# Patient Record
Sex: MALE | Race: WHITE | NOT HISPANIC OR LATINO | Employment: OTHER | URBAN - METROPOLITAN AREA
[De-identification: names, ages, dates, MRNs, and addresses within clinical notes are randomized per-mention and may not be internally consistent; named-entity substitution may affect disease eponyms.]

---

## 2017-10-25 NOTE — PATIENT DISCUSSION
MEIBOMIAN GLAND DYSFUNCTION, OU:  PRESCRIBE WARM COMPRESSES AND EYELID SCRUBS QD-BID, ARTIFICIAL TEARS BID-QID, THE DAILY INTAKE OF OMEGA-3 FATTY ACIDS  AND_____________________. WILL CONSIDER LIPIFLOW TREATMENT NEXT VISIT IF NOT RESPONSIVE TO TREATMENT OR IF SYMPTOMS PERSIST. RETURN FOR FOLLOW-UP AS SCHEDULED.

## 2017-10-25 NOTE — PATIENT DISCUSSION
demodex counseling:  If an older patient presents with blepharitis, especially an older patient with rosacea, Demodex mite infestation could be the cause. The incidence of Demodex infestation increases with age, occurring in 80 percent of the population at age 61 and in 80 percent of the population older than 79years of age. 1

## 2017-11-29 NOTE — PATIENT DISCUSSION
still minimal clinical findings. pt still has some mild demodex however insurance will not cover appropriate treatment. continue lid scrubs to minimize irritation from debris and add systane balance qid.

## 2017-12-29 NOTE — PATIENT DISCUSSION
New Prescription: Artificial Tears (PF) (dextran 70-hypromellose (pf)): dropperette: 0.1-0.3% 1 drop four times a day into both eyes 12-

## 2018-11-07 NOTE — PATIENT DISCUSSION
MEIBOMIAN GLAND DYSFUNCTION OU:  I have explained that dry eye syndrome may cause many ocular symptoms including irritation, burning, tearing, and blurry vision. Frequent high quality artificial tears will help relieve these symptoms. Recommend patient use over the counter artificial tears at least four times daily. The patient was given a list of quality artificial tears to choose from (Genteal, Systane, Refresh, TheraTears, Blink) and was advised not to use Visine or Clear Eyes. Warm compresses with digital massage three times daily. Advised patient that if symptoms of discomfort did not improve or if they worse, then the patient should return to clinic. Will continue to monitor.

## 2018-11-07 NOTE — PATIENT DISCUSSION
POSTERIOR CAPSULAR OPACITY, OU: Educated patient about diagnosis and that symptoms of worsening growth may be similar to a cataract but it is not a cataract. Informed patient that in the future, a laser procedure called a YAG Capsulotomy may be needed to clear up vision. YAG Capsulotomy not indicated at this time. Will continue to monitor and evaluate yearly.

## 2018-11-07 NOTE — PATIENT DISCUSSION
PTOSIS BUL: Patient is not bothered by condition. Educated patient that should the they become symptomatic to the eyelids bothering vision, a referral to Dr. Shakira Duke can be arranged for a lid evaluation and possible lid surgery. Will continue to monitor.

## 2018-11-07 NOTE — PATIENT DISCUSSION
BLEPHARITIS:  I have explained the nature of chronic blepharitis and have instructed the patient in lid hygiene techniques. The patient is instructed to gently wash the lid margins daily with warm water and baby shampoo on a washrag followed by warm water irrigation.

## 2021-01-18 ENCOUNTER — NEW PATIENT COMPREHENSIVE (OUTPATIENT)
Dept: URBAN - METROPOLITAN AREA CLINIC 47 | Facility: CLINIC | Age: 71
End: 2021-01-18

## 2021-01-18 DIAGNOSIS — H52.7: ICD-10-CM

## 2021-01-18 DIAGNOSIS — Z96.1: ICD-10-CM

## 2021-01-18 DIAGNOSIS — H35.371: ICD-10-CM

## 2021-01-18 PROCEDURE — 92015 DETERMINE REFRACTIVE STATE: CPT

## 2021-01-18 PROCEDURE — 92004 COMPRE OPH EXAM NEW PT 1/>: CPT

## 2021-01-18 ASSESSMENT — VISUAL ACUITY
OS_SC: 20/30-1
OS_SC: J2-
OD_SC: J12
OD_SC: 20/60-2
OD_CC: J8+
OS_CC: J2+

## 2021-01-18 ASSESSMENT — TONOMETRY
OS_IOP_MMHG: 16
OD_IOP_MMHG: 16

## 2021-01-20 ENCOUNTER — RETINA CONSULT (OUTPATIENT)
Dept: URBAN - METROPOLITAN AREA CLINIC 43 | Facility: CLINIC | Age: 71
End: 2021-01-20

## 2021-01-20 DIAGNOSIS — H35.371: ICD-10-CM

## 2021-01-20 DIAGNOSIS — H35.033: ICD-10-CM

## 2021-01-20 DIAGNOSIS — H35.30: ICD-10-CM

## 2021-01-20 PROCEDURE — 92250 FUNDUS PHOTOGRAPHY W/I&R: CPT

## 2021-01-20 PROCEDURE — 99214 OFFICE O/P EST MOD 30 MIN: CPT

## 2021-01-20 ASSESSMENT — TONOMETRY
OD_IOP_MMHG: 13
OS_IOP_MMHG: 13

## 2021-01-20 ASSESSMENT — VISUAL ACUITY
OD_SC: 20/60
OS_SC: 20/30+1

## 2021-10-25 ENCOUNTER — ESTABLISHED COMPREHENSIVE EXAM (OUTPATIENT)
Dept: URBAN - METROPOLITAN AREA CLINIC 47 | Facility: CLINIC | Age: 71
End: 2021-10-25

## 2021-10-25 DIAGNOSIS — H35.371: ICD-10-CM

## 2021-10-25 DIAGNOSIS — H35.033: ICD-10-CM

## 2021-10-25 DIAGNOSIS — H16.223: ICD-10-CM

## 2021-10-25 DIAGNOSIS — H43.813: ICD-10-CM

## 2021-10-25 DIAGNOSIS — H52.7: ICD-10-CM

## 2021-10-25 DIAGNOSIS — H52.03: ICD-10-CM

## 2021-10-25 PROCEDURE — 68761Q PUNCTAL PLUG/QUINTESS DISSOLVABLE PLUG/EACH

## 2021-10-25 PROCEDURE — 92014 COMPRE OPH EXAM EST PT 1/>: CPT

## 2021-10-25 PROCEDURE — A4262 TEMPORARY TEAR DUCT PLUG: HCPCS

## 2021-10-25 PROCEDURE — 92015 DETERMINE REFRACTIVE STATE: CPT

## 2021-10-25 RX ORDER — CYCLOSPORINE 0.5 MG/ML: 1 EMULSION OPHTHALMIC TWICE A DAY

## 2021-10-25 ASSESSMENT — TONOMETRY
OD_IOP_MMHG: 15
OS_IOP_MMHG: 15

## 2021-10-25 ASSESSMENT — VISUAL ACUITY
OS_SC: 20/25
OS_SC: J2
OD_SC: J12
OD_SC: 20/60-2

## 2022-02-01 ENCOUNTER — FOLLOW UP (OUTPATIENT)
Dept: URBAN - METROPOLITAN AREA CLINIC 47 | Facility: CLINIC | Age: 72
End: 2022-02-01

## 2022-02-01 DIAGNOSIS — Z96.1: ICD-10-CM

## 2022-02-01 DIAGNOSIS — H16.223: ICD-10-CM

## 2022-02-01 DIAGNOSIS — H26.493: ICD-10-CM

## 2022-02-01 PROCEDURE — V2799CY CYCLOSPORINE 0.1% - KLARITY C

## 2022-02-01 PROCEDURE — 68761S PUNCTUM PLUG / SILICONE,EACH

## 2022-02-01 PROCEDURE — 92012 INTRM OPH EXAM EST PATIENT: CPT

## 2022-02-01 PROCEDURE — A4263 PERMANENT TEAR DUCT PLUG: HCPCS

## 2022-02-01 ASSESSMENT — VISUAL ACUITY
OD_SC: 20/50
OS_SC: 20/30-2
OU_SC: 20/25

## 2022-02-01 ASSESSMENT — TONOMETRY
OD_IOP_MMHG: 14
OS_IOP_MMHG: 13

## 2022-11-15 ENCOUNTER — ESTABLISHED PATIENT (OUTPATIENT)
Dept: URBAN - METROPOLITAN AREA CLINIC 44 | Facility: CLINIC | Age: 72
End: 2022-11-15

## 2022-11-15 ENCOUNTER — COMPREHENSIVE EXAM (OUTPATIENT)
Dept: URBAN - METROPOLITAN AREA CLINIC 47 | Facility: CLINIC | Age: 72
End: 2022-11-15

## 2022-11-15 DIAGNOSIS — H52.03: ICD-10-CM

## 2022-11-15 DIAGNOSIS — H16.223: ICD-10-CM

## 2022-11-15 DIAGNOSIS — H35.033: ICD-10-CM

## 2022-11-15 DIAGNOSIS — H26.493: ICD-10-CM

## 2022-11-15 DIAGNOSIS — D23.122: ICD-10-CM

## 2022-11-15 DIAGNOSIS — D23.112: ICD-10-CM

## 2022-11-15 DIAGNOSIS — H35.373: ICD-10-CM

## 2022-11-15 DIAGNOSIS — H43.813: ICD-10-CM

## 2022-11-15 DIAGNOSIS — H11.042: ICD-10-CM

## 2022-11-15 DIAGNOSIS — Z96.1: ICD-10-CM

## 2022-11-15 PROCEDURE — 92015 DETERMINE REFRACTIVE STATE: CPT

## 2022-11-15 PROCEDURE — 92014 COMPRE OPH EXAM EST PT 1/>: CPT

## 2022-11-15 PROCEDURE — 99213 OFFICE O/P EST LOW 20 MIN: CPT

## 2022-11-15 PROCEDURE — 68761S PUNCTUM PLUG / SILICONE,EACH

## 2022-11-15 PROCEDURE — 67840 REMOVE EYELID LESION: CPT

## 2022-11-15 PROCEDURE — A4263 PERMANENT TEAR DUCT PLUG: HCPCS

## 2022-11-15 PROCEDURE — 92134 CPTRZ OPH DX IMG PST SGM RTA: CPT

## 2022-11-15 PROCEDURE — 92285 EXTERNAL OCULAR PHOTOGRAPHY: CPT

## 2022-11-15 ASSESSMENT — VISUAL ACUITY
OU_SC: 20/30-2
OD_SC: 20/60-1
OD_SC: 20/60
OS_SC: J4
OS_SC: 20/40
OD_SC: J12
OU_SC: J3
OS_SC: 20/40

## 2022-11-15 ASSESSMENT — TONOMETRY
OS_IOP_MMHG: 16
OD_IOP_MMHG: 16

## 2023-03-27 NOTE — PATIENT DISCUSSION
DEMODEX BLEPHARITIS OU:  very mild, try normal improved hygiene first bilateral LE grossly 3/5, bilateral UE grossly 3+/5 to WFL/WNL/grossly assessed due to